# Patient Record
Sex: FEMALE | Race: BLACK OR AFRICAN AMERICAN | URBAN - METROPOLITAN AREA
[De-identification: names, ages, dates, MRNs, and addresses within clinical notes are randomized per-mention and may not be internally consistent; named-entity substitution may affect disease eponyms.]

---

## 2021-05-26 ENCOUNTER — LAB OUTSIDE AN ENCOUNTER (OUTPATIENT)
Dept: URBAN - METROPOLITAN AREA CLINIC 105 | Facility: CLINIC | Age: 36
End: 2021-05-26

## 2021-05-26 ENCOUNTER — OFFICE VISIT (OUTPATIENT)
Dept: URBAN - METROPOLITAN AREA CLINIC 105 | Facility: CLINIC | Age: 36
End: 2021-05-26
Payer: OTHER GOVERNMENT

## 2021-05-26 ENCOUNTER — DASHBOARD ENCOUNTERS (OUTPATIENT)
Age: 36
End: 2021-05-26

## 2021-05-26 ENCOUNTER — WEB ENCOUNTER (OUTPATIENT)
Dept: URBAN - METROPOLITAN AREA CLINIC 105 | Facility: CLINIC | Age: 36
End: 2021-05-26

## 2021-05-26 VITALS
DIASTOLIC BLOOD PRESSURE: 71 MMHG | HEART RATE: 88 BPM | HEIGHT: 64 IN | BODY MASS INDEX: 24.92 KG/M2 | TEMPERATURE: 97.9 F | SYSTOLIC BLOOD PRESSURE: 103 MMHG | WEIGHT: 146 LBS

## 2021-05-26 DIAGNOSIS — K59.00 CONSTIPATION, UNSPECIFIED CONSTIPATION TYPE: ICD-10-CM

## 2021-05-26 DIAGNOSIS — M62.89 PELVIC FLOOR DYSFUNCTION: ICD-10-CM

## 2021-05-26 PROCEDURE — 99204 OFFICE O/P NEW MOD 45 MIN: CPT | Performed by: INTERNAL MEDICINE

## 2021-05-26 RX ORDER — LINACLOTIDE 72 UG/1
1-2 CAPSULES AT LEAST 30 MINUTES BEFORE THE FIRST MEAL OF THE DAY ON AN EMPTY STOMACH CAPSULE, GELATIN COATED ORAL ONCE A DAY
Qty: 30 | Refills: 2 | OUTPATIENT
Start: 2021-05-26 | End: 2021-08-24

## 2021-05-26 NOTE — HPI-TODAY'S VISIT:
The patient presents for constipation.  Today, the patient reports constipation as an issue for the past 18 years. She can go 14 days without a BM when not taking anything. Daily, she has to digitally help herself have a BM. She is in the  and has had difficulty getting consistent treatment over the years as a result - saying she usually never saw a GI more than once. However, she is currently stationed at a [employer] unit in [place] and wants to resume treatment since she will be in [place] for the next couple years. When she was stationed in [place] in [id number], tests such as a sitz marker study and defecography (2012) were done and she was told she had a "lazy colon muscle" (pelvic floor dysfunction) and started physical therapy. Once she was transferred to [place], she found it difficult to continue treatment and it was much of the same when she was in [place]. In the past, she has been given Colace, told to change her diet, and discussed having a colon. She temporarily took Amitiza 2 pills QD (unsure of strength) which helped significantly for the first 1-2 months of use, but still felt incomplete evacuation and had liquid BMs. Years ago she took Miralax 1 cap TID mixed with apple juice and a smoothie without a BM on a regular basis. Once she started on Miralax, she decided to go on a vegan diet she states. She now takes a lower bowel stimulant (OTC that has cascara) which provides more evacuation than Miralax but it is still incomplete she says. She has also tried other OTC meds/supplements like ones from the [facility].

## 2021-05-27 ENCOUNTER — TELEPHONE ENCOUNTER (OUTPATIENT)
Dept: URBAN - METROPOLITAN AREA CLINIC 105 | Facility: CLINIC | Age: 36
End: 2021-05-27

## 2021-05-27 PROBLEM — 14760008: Status: ACTIVE | Noted: 2021-05-26

## 2021-05-27 LAB
A/G RATIO: 1.7
ALBUMIN: 4.5
ALKALINE PHOSPHATASE: 47
ALT (SGPT): 14
AST (SGOT): 18
BASO (ABSOLUTE): 0
BASOS: 1
BILIRUBIN, TOTAL: 0.6
BUN/CREATININE RATIO: 8
BUN: 7
CALCIUM: 9.8
CARBON DIOXIDE, TOTAL: 25
CHLORIDE: 107
CREATININE: 0.91
EGFR IF AFRICN AM: 93
EGFR IF NONAFRICN AM: 80
EOS (ABSOLUTE): 0.1
EOS: 4
GLOBULIN, TOTAL: 2.6
GLUCOSE: 96
HEMATOCRIT: 36.9
HEMATOLOGY COMMENTS:: (no result)
HEMOGLOBIN: 11.7
IMMATURE CELLS: (no result)
IMMATURE GRANS (ABS): 0
IMMATURE GRANULOCYTES: 0
LYMPHS (ABSOLUTE): 1.7
LYMPHS: 45
MCH: 28.1
MCHC: 31.7
MCV: 89
MONOCYTES(ABSOLUTE): 0.3
MONOCYTES: 8
NEUTROPHILS (ABSOLUTE): 1.5
NEUTROPHILS: 42
NRBC: (no result)
PLATELETS: 294
POTASSIUM: 5.2
PROTEIN, TOTAL: 7.1
RBC: 4.16
RDW: 12.6
SODIUM: 142
T4,FREE(DIRECT): 1.21
TSH: 0.87
WBC: 3.6

## 2021-06-01 ENCOUNTER — TELEPHONE ENCOUNTER (OUTPATIENT)
Dept: URBAN - METROPOLITAN AREA CLINIC 92 | Facility: CLINIC | Age: 36
End: 2021-06-01

## 2021-06-01 RX ORDER — LACTULOSE 10 G/15ML
15 ML-30 ML SOLUTION ORAL BID
Qty: 420 ML | Refills: 5 | OUTPATIENT
Start: 2021-06-01 | End: 2021-08-24

## 2021-06-07 ENCOUNTER — OFFICE VISIT (OUTPATIENT)
Dept: URBAN - METROPOLITAN AREA SURGERY CENTER 16 | Facility: SURGERY CENTER | Age: 36
End: 2021-06-07
Payer: OTHER GOVERNMENT

## 2021-06-07 ENCOUNTER — CLAIMS CREATED FROM THE CLAIM WINDOW (OUTPATIENT)
Dept: URBAN - METROPOLITAN AREA CLINIC 4 | Facility: CLINIC | Age: 36
End: 2021-06-07
Payer: OTHER GOVERNMENT

## 2021-06-07 DIAGNOSIS — K63.89 BACTERIAL OVERGROWTH SYNDROME: ICD-10-CM

## 2021-06-07 DIAGNOSIS — D17.9 BENIGN LIPOMATOUS NEOPLASM, UNSPECIFIED: ICD-10-CM

## 2021-06-07 DIAGNOSIS — D17.5 BENIGN LIPOMATOUS NEOPLASM OF INTRA-ABDOMINAL ORGANS: ICD-10-CM

## 2021-06-07 DIAGNOSIS — K63.89 JEJUNAL POLYP: ICD-10-CM

## 2021-06-07 DIAGNOSIS — Z12.11 COLON CANCER SCREENING: ICD-10-CM

## 2021-06-07 PROCEDURE — 45380 COLONOSCOPY AND BIOPSY: CPT | Performed by: INTERNAL MEDICINE

## 2021-06-07 PROCEDURE — 88305 TISSUE EXAM BY PATHOLOGIST: CPT | Performed by: PATHOLOGY

## 2021-06-07 PROCEDURE — G8907 PT DOC NO EVENTS ON DISCHARG: HCPCS | Performed by: INTERNAL MEDICINE

## 2021-06-07 RX ORDER — LINACLOTIDE 72 UG/1
1-2 CAPSULES AT LEAST 30 MINUTES BEFORE THE FIRST MEAL OF THE DAY ON AN EMPTY STOMACH CAPSULE, GELATIN COATED ORAL ONCE A DAY
Qty: 30 | Refills: 2 | Status: ACTIVE | COMMUNITY
Start: 2021-05-26 | End: 2021-08-24

## 2021-06-07 RX ORDER — LACTULOSE 10 G/15ML
15 ML-30 ML SOLUTION ORAL BID
Qty: 420 ML | Refills: 5 | Status: ACTIVE | COMMUNITY
Start: 2021-06-01 | End: 2021-08-24